# Patient Record
(demographics unavailable — no encounter records)

---

## 2018-04-27 NOTE — DIAGNOSTIC IMAGING REPORT
#[id number] - [id number]

#BILATERAL DIGITAL SCREENING MAMMOGRAM WITH CAD: 4/24/2018

CLINICAL: Routine screening.  



Comparison is made to exam dated:  3/2/2017 mammogram - [facility].  Current

 study contains 5 films.  

There are scattered fibroglandular elements in both breasts.  

Current study was also evaluated with a Computer Aided Detection (CAD) system.  

There are benign calcifications and a lymph node in both breasts.  

No significant masses, calcifications, or other findings are seen in either breast.  

There has been no significant interval change.



IMPRESSION: BENIGN

There is no mammographic evidence of malignancy.  A 1 year screening mammogram is recommended. 

The patient will be notified by letter of the results.  





Gold lawler/anya:4/27/2018 12:37:29  



Imaging Technologist: Monae HERNANDEZ)(DANDY), [facility]

letter sent: Compared to Prior B9  

Mammogram BI-RADS: 2 Benign